# Patient Record
Sex: FEMALE | Race: WHITE | NOT HISPANIC OR LATINO | URBAN - METROPOLITAN AREA
[De-identification: names, ages, dates, MRNs, and addresses within clinical notes are randomized per-mention and may not be internally consistent; named-entity substitution may affect disease eponyms.]

---

## 2017-09-19 ENCOUNTER — INPATIENT (INPATIENT)
Facility: HOSPITAL | Age: 37
LOS: 0 days | Discharge: HOME | DRG: 392 | End: 2017-09-19
Attending: HOSPITALIST | Admitting: HOSPITALIST
Payer: MEDICAID

## 2017-09-19 ENCOUNTER — TRANSCRIPTION ENCOUNTER (OUTPATIENT)
Age: 37
End: 2017-09-19

## 2017-09-19 VITALS
HEART RATE: 75 BPM | OXYGEN SATURATION: 100 % | TEMPERATURE: 98 F | SYSTOLIC BLOOD PRESSURE: 129 MMHG | RESPIRATION RATE: 22 BRPM | DIASTOLIC BLOOD PRESSURE: 72 MMHG

## 2017-09-19 VITALS
TEMPERATURE: 98 F | OXYGEN SATURATION: 98 % | SYSTOLIC BLOOD PRESSURE: 101 MMHG | HEART RATE: 73 BPM | RESPIRATION RATE: 16 BRPM | DIASTOLIC BLOOD PRESSURE: 63 MMHG

## 2017-09-19 DIAGNOSIS — R10.84 GENERALIZED ABDOMINAL PAIN: ICD-10-CM

## 2017-09-19 DIAGNOSIS — K29.00 ACUTE GASTRITIS WITHOUT BLEEDING: ICD-10-CM

## 2017-09-19 DIAGNOSIS — Z29.9 ENCOUNTER FOR PROPHYLACTIC MEASURES, UNSPECIFIED: ICD-10-CM

## 2017-09-19 LAB
ALBUMIN SERPL ELPH-MCNC: 4.7 G/DL — SIGNIFICANT CHANGE UP (ref 3.3–5)
ALP SERPL-CCNC: 67 U/L — SIGNIFICANT CHANGE UP (ref 40–120)
ALT FLD-CCNC: 12 U/L RC — SIGNIFICANT CHANGE UP (ref 10–45)
ANION GAP SERPL CALC-SCNC: 17 MMOL/L — SIGNIFICANT CHANGE UP (ref 5–17)
APPEARANCE UR: CLEAR — SIGNIFICANT CHANGE UP
AST SERPL-CCNC: 16 U/L — SIGNIFICANT CHANGE UP (ref 10–40)
BACTERIA # UR AUTO: ABNORMAL /HPF
BASOPHILS # BLD AUTO: 0.1 K/UL — SIGNIFICANT CHANGE UP (ref 0–0.2)
BASOPHILS NFR BLD AUTO: 0.8 % — SIGNIFICANT CHANGE UP (ref 0–2)
BILIRUB SERPL-MCNC: 0.2 MG/DL — SIGNIFICANT CHANGE UP (ref 0.2–1.2)
BILIRUB UR-MCNC: NEGATIVE — SIGNIFICANT CHANGE UP
BUN SERPL-MCNC: 12 MG/DL — SIGNIFICANT CHANGE UP (ref 7–23)
CALCIUM SERPL-MCNC: 9.8 MG/DL — SIGNIFICANT CHANGE UP (ref 8.4–10.5)
CHLORIDE SERPL-SCNC: 100 MMOL/L — SIGNIFICANT CHANGE UP (ref 96–108)
CO2 SERPL-SCNC: 23 MMOL/L — SIGNIFICANT CHANGE UP (ref 22–31)
COLOR SPEC: COLORLESS — SIGNIFICANT CHANGE UP
COMMENT - URINE: SIGNIFICANT CHANGE UP
CREAT SERPL-MCNC: 0.64 MG/DL — SIGNIFICANT CHANGE UP (ref 0.5–1.3)
DIFF PNL FLD: NEGATIVE — SIGNIFICANT CHANGE UP
EOSINOPHIL # BLD AUTO: 0.1 K/UL — SIGNIFICANT CHANGE UP (ref 0–0.5)
EOSINOPHIL NFR BLD AUTO: 1.6 % — SIGNIFICANT CHANGE UP (ref 0–6)
GAS PNL BLDV: SIGNIFICANT CHANGE UP
GAS PNL BLDV: SIGNIFICANT CHANGE UP
GLUCOSE SERPL-MCNC: 121 MG/DL — HIGH (ref 70–99)
GLUCOSE UR QL: NEGATIVE — SIGNIFICANT CHANGE UP
HCT VFR BLD CALC: 40.1 % — SIGNIFICANT CHANGE UP (ref 34.5–45)
HGB BLD-MCNC: 13.6 G/DL — SIGNIFICANT CHANGE UP (ref 11.5–15.5)
KETONES UR-MCNC: NEGATIVE — SIGNIFICANT CHANGE UP
LEUKOCYTE ESTERASE UR-ACNC: NEGATIVE — SIGNIFICANT CHANGE UP
LIDOCAIN IGE QN: 47 U/L — SIGNIFICANT CHANGE UP (ref 7–60)
LYMPHOCYTES # BLD AUTO: 2.3 K/UL — SIGNIFICANT CHANGE UP (ref 1–3.3)
LYMPHOCYTES # BLD AUTO: 31.4 % — SIGNIFICANT CHANGE UP (ref 13–44)
MCHC RBC-ENTMCNC: 27.9 PG — SIGNIFICANT CHANGE UP (ref 27–34)
MCHC RBC-ENTMCNC: 33.8 GM/DL — SIGNIFICANT CHANGE UP (ref 32–36)
MCV RBC AUTO: 82.7 FL — SIGNIFICANT CHANGE UP (ref 80–100)
MONOCYTES # BLD AUTO: 0.4 K/UL — SIGNIFICANT CHANGE UP (ref 0–0.9)
MONOCYTES NFR BLD AUTO: 5.7 % — SIGNIFICANT CHANGE UP (ref 2–14)
NEUTROPHILS # BLD AUTO: 4.4 K/UL — SIGNIFICANT CHANGE UP (ref 1.8–7.4)
NEUTROPHILS NFR BLD AUTO: 60.5 % — SIGNIFICANT CHANGE UP (ref 43–77)
NITRITE UR-MCNC: NEGATIVE — SIGNIFICANT CHANGE UP
PH UR: 6 — SIGNIFICANT CHANGE UP (ref 5–8)
PLATELET # BLD AUTO: 310 K/UL — SIGNIFICANT CHANGE UP (ref 150–400)
POTASSIUM SERPL-MCNC: 4.1 MMOL/L — SIGNIFICANT CHANGE UP (ref 3.5–5.3)
POTASSIUM SERPL-SCNC: 4.1 MMOL/L — SIGNIFICANT CHANGE UP (ref 3.5–5.3)
PROT SERPL-MCNC: 8.1 G/DL — SIGNIFICANT CHANGE UP (ref 6–8.3)
PROT UR-MCNC: NEGATIVE — SIGNIFICANT CHANGE UP
RBC # BLD: 4.85 M/UL — SIGNIFICANT CHANGE UP (ref 3.8–5.2)
RBC # FLD: 12 % — SIGNIFICANT CHANGE UP (ref 10.3–14.5)
SODIUM SERPL-SCNC: 140 MMOL/L — SIGNIFICANT CHANGE UP (ref 135–145)
SP GR SPEC: 1.01 — LOW (ref 1.01–1.02)
UROBILINOGEN FLD QL: NEGATIVE — SIGNIFICANT CHANGE UP
WBC # BLD: 7.4 K/UL — SIGNIFICANT CHANGE UP (ref 3.8–10.5)
WBC # FLD AUTO: 7.4 K/UL — SIGNIFICANT CHANGE UP (ref 3.8–10.5)
WBC UR QL: SIGNIFICANT CHANGE UP /HPF (ref 0–5)

## 2017-09-19 PROCEDURE — 85014 HEMATOCRIT: CPT

## 2017-09-19 PROCEDURE — 85027 COMPLETE CBC AUTOMATED: CPT

## 2017-09-19 PROCEDURE — 82435 ASSAY OF BLOOD CHLORIDE: CPT

## 2017-09-19 PROCEDURE — 93005 ELECTROCARDIOGRAM TRACING: CPT

## 2017-09-19 PROCEDURE — 99285 EMERGENCY DEPT VISIT HI MDM: CPT | Mod: 25

## 2017-09-19 PROCEDURE — 87086 URINE CULTURE/COLONY COUNT: CPT

## 2017-09-19 PROCEDURE — 93010 ELECTROCARDIOGRAM REPORT: CPT

## 2017-09-19 PROCEDURE — 84295 ASSAY OF SERUM SODIUM: CPT

## 2017-09-19 PROCEDURE — 74177 CT ABD & PELVIS W/CONTRAST: CPT | Mod: 26

## 2017-09-19 PROCEDURE — 80053 COMPREHEN METABOLIC PANEL: CPT

## 2017-09-19 PROCEDURE — 84132 ASSAY OF SERUM POTASSIUM: CPT

## 2017-09-19 PROCEDURE — 96375 TX/PRO/DX INJ NEW DRUG ADDON: CPT

## 2017-09-19 PROCEDURE — 96374 THER/PROPH/DIAG INJ IV PUSH: CPT

## 2017-09-19 PROCEDURE — 83690 ASSAY OF LIPASE: CPT

## 2017-09-19 PROCEDURE — 82330 ASSAY OF CALCIUM: CPT

## 2017-09-19 PROCEDURE — G0378: CPT

## 2017-09-19 PROCEDURE — 74177 CT ABD & PELVIS W/CONTRAST: CPT

## 2017-09-19 PROCEDURE — 82947 ASSAY GLUCOSE BLOOD QUANT: CPT

## 2017-09-19 PROCEDURE — 99235 HOSP IP/OBS SAME DATE MOD 70: CPT

## 2017-09-19 PROCEDURE — 82803 BLOOD GASES ANY COMBINATION: CPT

## 2017-09-19 PROCEDURE — 81001 URINALYSIS AUTO W/SCOPE: CPT

## 2017-09-19 PROCEDURE — 83605 ASSAY OF LACTIC ACID: CPT

## 2017-09-19 RX ORDER — METOCLOPRAMIDE HCL 10 MG
10 TABLET ORAL ONCE
Qty: 0 | Refills: 0 | Status: COMPLETED | OUTPATIENT
Start: 2017-09-19 | End: 2017-09-19

## 2017-09-19 RX ORDER — SUCRALFATE 1 G
1 TABLET ORAL THREE TIMES A DAY
Qty: 0 | Refills: 0 | Status: DISCONTINUED | OUTPATIENT
Start: 2017-09-19 | End: 2017-09-19

## 2017-09-19 RX ORDER — PANTOPRAZOLE SODIUM 20 MG/1
1 TABLET, DELAYED RELEASE ORAL
Qty: 30 | Refills: 0 | OUTPATIENT
Start: 2017-09-19 | End: 2017-10-19

## 2017-09-19 RX ORDER — ONDANSETRON 8 MG/1
4 TABLET, FILM COATED ORAL ONCE
Qty: 0 | Refills: 0 | Status: COMPLETED | OUTPATIENT
Start: 2017-09-19 | End: 2017-09-19

## 2017-09-19 RX ORDER — ACETAMINOPHEN 500 MG
1000 TABLET ORAL ONCE
Qty: 0 | Refills: 0 | Status: COMPLETED | OUTPATIENT
Start: 2017-09-19 | End: 2017-09-19

## 2017-09-19 RX ORDER — SUCRALFATE 1 G
10 TABLET ORAL
Qty: 500 | Refills: 0 | OUTPATIENT
Start: 2017-09-19

## 2017-09-19 RX ORDER — FAMOTIDINE 10 MG/ML
20 INJECTION INTRAVENOUS ONCE
Qty: 0 | Refills: 0 | Status: COMPLETED | OUTPATIENT
Start: 2017-09-19 | End: 2017-09-19

## 2017-09-19 RX ORDER — LIDOCAINE 4 G/100G
15 CREAM TOPICAL ONCE
Qty: 0 | Refills: 0 | Status: COMPLETED | OUTPATIENT
Start: 2017-09-19 | End: 2017-09-19

## 2017-09-19 RX ORDER — SODIUM CHLORIDE 9 MG/ML
1000 INJECTION INTRAMUSCULAR; INTRAVENOUS; SUBCUTANEOUS ONCE
Qty: 0 | Refills: 0 | Status: COMPLETED | OUTPATIENT
Start: 2017-09-19 | End: 2017-09-19

## 2017-09-19 RX ORDER — SUCRALFATE 1 G
10 TABLET ORAL
Qty: 500 | Refills: 0 | OUTPATIENT
Start: 2017-09-19 | End: 2017-10-03

## 2017-09-19 RX ORDER — SUCRALFATE 1 G
1 TABLET ORAL
Qty: 90 | Refills: 0 | OUTPATIENT
Start: 2017-09-19 | End: 2017-10-19

## 2017-09-19 RX ORDER — MORPHINE SULFATE 50 MG/1
4 CAPSULE, EXTENDED RELEASE ORAL ONCE
Qty: 0 | Refills: 0 | Status: DISCONTINUED | OUTPATIENT
Start: 2017-09-19 | End: 2017-09-19

## 2017-09-19 RX ORDER — PANTOPRAZOLE SODIUM 20 MG/1
40 TABLET, DELAYED RELEASE ORAL
Qty: 0 | Refills: 0 | Status: DISCONTINUED | OUTPATIENT
Start: 2017-09-19 | End: 2017-09-19

## 2017-09-19 RX ORDER — ESOMEPRAZOLE MAGNESIUM 40 MG/1
1 CAPSULE, DELAYED RELEASE ORAL
Qty: 30 | Refills: 0 | OUTPATIENT
Start: 2017-09-19 | End: 2017-10-19

## 2017-09-19 RX ORDER — SODIUM CHLORIDE 9 MG/ML
2000 INJECTION INTRAMUSCULAR; INTRAVENOUS; SUBCUTANEOUS ONCE
Qty: 0 | Refills: 0 | Status: COMPLETED | OUTPATIENT
Start: 2017-09-19 | End: 2017-09-19

## 2017-09-19 RX ORDER — SUCRALFATE 1 G
1 TABLET ORAL ONCE
Qty: 0 | Refills: 0 | Status: COMPLETED | OUTPATIENT
Start: 2017-09-19 | End: 2017-09-19

## 2017-09-19 RX ORDER — PANTOPRAZOLE SODIUM 20 MG/1
1 TABLET, DELAYED RELEASE ORAL
Qty: 0 | Refills: 0 | COMMUNITY
Start: 2017-09-19

## 2017-09-19 RX ADMIN — PANTOPRAZOLE SODIUM 40 MILLIGRAM(S): 20 TABLET, DELAYED RELEASE ORAL at 12:23

## 2017-09-19 RX ADMIN — LIDOCAINE 15 MILLILITER(S): 4 CREAM TOPICAL at 04:59

## 2017-09-19 RX ADMIN — MORPHINE SULFATE 4 MILLIGRAM(S): 50 CAPSULE, EXTENDED RELEASE ORAL at 08:06

## 2017-09-19 RX ADMIN — Medication 10 MILLIGRAM(S): at 08:06

## 2017-09-19 RX ADMIN — SODIUM CHLORIDE 4000 MILLILITER(S): 9 INJECTION INTRAMUSCULAR; INTRAVENOUS; SUBCUTANEOUS at 02:11

## 2017-09-19 RX ADMIN — Medication 1000 MILLIGRAM(S): at 04:39

## 2017-09-19 RX ADMIN — Medication 1 GRAM(S): at 04:58

## 2017-09-19 RX ADMIN — SODIUM CHLORIDE 2000 MILLILITER(S): 9 INJECTION INTRAMUSCULAR; INTRAVENOUS; SUBCUTANEOUS at 08:05

## 2017-09-19 RX ADMIN — ONDANSETRON 4 MILLIGRAM(S): 8 TABLET, FILM COATED ORAL at 02:12

## 2017-09-19 RX ADMIN — FAMOTIDINE 20 MILLIGRAM(S): 10 INJECTION INTRAVENOUS at 02:12

## 2017-09-19 RX ADMIN — Medication 400 MILLIGRAM(S): at 02:10

## 2017-09-19 RX ADMIN — Medication 30 MILLILITER(S): at 02:12

## 2017-09-19 NOTE — ED ADULT NURSE REASSESSMENT NOTE - NS ED NURSE REASSESS COMMENT FT1
report received from SAI Almendarez; vss; pt still complaining of 5/10 pain; pt does not want to be discharged; md martin at bedside

## 2017-09-19 NOTE — DISCHARGE NOTE ADULT - MEDICATION SUMMARY - MEDICATIONS TO TAKE
I will START or STAY ON the medications listed below when I get home from the hospital:    sucralfate 1 g/10 mL oral suspension  -- 10 milliliter(s) by mouth 3 times a day  -- Indication: For Gastritis    Protonix 40 mg oral delayed release tablet  -- 1 tab(s) by mouth once a day (before a meal)  -- Indication: For Gastritis

## 2017-09-19 NOTE — CONSULT NOTE ADULT - PROBLEM SELECTOR RECOMMENDATION 9
possibly related to gastritis  ct scan shows no structural issues  no gi objection to dc home with nexium daily, carafate 1g tid and outpatient follow up with her primary GI, if she tolerates lunch

## 2017-09-19 NOTE — DISCHARGE NOTE ADULT - CARE PROVIDER_API CALL
Shantanu Barrera), Gastroenterology  [address] [place] [address]  Phone: [phone]  Fax: [phone]

## 2017-09-19 NOTE — H&P ADULT - CARDIOVASCULAR COMMENTS
Palpitations about 2 month ago, seen by outpatient cardiologist, attributed to stress, no palpitations since.

## 2017-09-19 NOTE — ED PROVIDER NOTE - PROGRESS NOTE DETAILS
Guszack: Pain minimally improved. Will get CT abd/pelvis jami - pt endorsed to me at signout - minimal improvement of pain, ct no etiology of pain , abd soft , nt, no rebound or guarding - pt will be reeval by overnight team post signout (RE) and dispo decision made

## 2017-09-19 NOTE — ED ADULT NURSE NOTE - OBJECTIVE STATEMENT
38 y/o female with no med problems came in c/o abdominal pain radiates to back with nausea since lastnight. Pt denies vomiting or diarrhea, abdomen soft, non distended, tender to touch. Pt denies chest pain or SOB, no fever/chills. Denies any urinary symptoms. Safety maintained & continue monitor.

## 2017-09-19 NOTE — H&P ADULT - HISTORY OF PRESENT ILLNESS
The patient is a 37-year-old woman with PMH of PCOS and gastritis who presents with complaints of epigastric abdominal pain.  The patient reports a history of intermittent "seasonal abdominal pain" and says that she has seen a gastroenterologist yearly since she was in her 20's.  Earlier this year, she has some intermittent, dull, epigastric pain.  Endoscopy done in July showed mild gastritis with evidence of acid reflux.  The pain had resolved by the time of the EGD and the patient was told to take Zantac PRN.    The patient was in her usual state of health until 2 nights ago when she was awoken from sleep with epigastric pain.  The pain was similar to the past pain, but more intense, 9/10 in intensity, constant, radiated to the back.  The patient also reported intermittent feelings of bloating with discomfort radiating from the epigastric area to the throat.  The patient also had some nausea and had worsening pain with eating last night.  The patient took Prilosec yesterday with no relief.  No recent fevers.  No diarrhea, melena, or hematochezia.  No recent weight loss.    In the ED, the patient was given IV Morphine which relieved the pain.

## 2017-09-19 NOTE — H&P ADULT - NSHPLABSRESULTS_GEN_ALL_CORE
LABS:                        13.6   7.4   )-----------( 310      ( 19 Sep 2017 02:10 )             40.1         140  |  100  |  12  ----------------------------<  121<H>  4.1   |  23  |  0.64    Ca    9.8      19 Sep 2017 02:10    TPro  8.1  /  Alb  4.7  /  TBili  0.2  /  DBili  x   /  AST  16  /  ALT  12  /  AlkPhos  67      Lipase - 47      Urinalysis Basic - ( 19 Sep 2017 02:22 )    Color: x / Appearance: Clear / S.008 / pH: x  Gluc: x / Ketone: Negative  / Bili: Negative / Urobili: Negative   Blood: x / Protein: Negative / Nitrite: Negative   Leuk Esterase: Negative / RBC: x / WBC 0-2 /HPF   Sq Epi: x / Non Sq Epi: x / Bacteria: Few /HPF        RADIOLOGY & ADDITIONAL TESTS:    < from: CT Abdomen and Pelvis w/ Oral Cont and w/ IV Cont (17 @ 06:10) >    IMPRESSION:     No bowel obstruction, drainable fluid collection or intraperitoneal free   air. No significant bowel wall thickening or inflammatory change.    < end of copied text > LABS:                        13.6   7.4   )-----------( 310      ( 19 Sep 2017 02:10 )             40.1         140  |  100  |  12  ----------------------------<  121<H>  4.1   |  23  |  0.64    Ca    9.8      19 Sep 2017 02:10    TPro  8.1  /  Alb  4.7  /  TBili  0.2  /  DBili  x   /  AST  16  /  ALT  12  /  AlkPhos  67      Lipase - 47      Urinalysis Basic - ( 19 Sep 2017 02:22 )    Color: x / Appearance: Clear / S.008 / pH: x  Gluc: x / Ketone: Negative  / Bili: Negative / Urobili: Negative   Blood: x / Protein: Negative / Nitrite: Negative   Leuk Esterase: Negative / RBC: x / WBC 0-2 /HPF   Sq Epi: x / Non Sq Epi: x / Bacteria: Few /HPF    EKG - NSR, HR 62.    RADIOLOGY & ADDITIONAL TESTS:    < from: CT Abdomen and Pelvis w/ Oral Cont and w/ IV Cont (17 @ 06:10) >    IMPRESSION:     No bowel obstruction, drainable fluid collection or intraperitoneal free   air. No significant bowel wall thickening or inflammatory change.    < end of copied text >

## 2017-09-19 NOTE — ED PROVIDER NOTE - MEDICAL DECISION MAKING DETAILS
37yof w/ non-specific abdominal pain, nausea, overall benign abdominal exam with no focal tenderness. Will check basic labs, lipase, UA, antiemetics, antacids, and reassess. No indication for imaging at this time.

## 2017-09-19 NOTE — H&P ADULT - FAMILY HISTORY
Mother  Still living? Unknown  Family history of diabetes mellitus type II, Age at diagnosis: Age Unknown     Father  Still living? Yes, Estimated age: Age Unknown  Family history of Crohn's disease, Age at diagnosis: Age Unknown

## 2017-09-19 NOTE — ED PROVIDER NOTE - OBJECTIVE STATEMENT
37yof w/ PCOS p/w diffuse abdominal pain x 2 days. Gradual onset of a diffuse, dull aching abdominal pain, steadily worsening, now 10/10, radiates to the mid back, associated w/ nausea 37yof w/ PCOS p/w diffuse abdominal pain x 2 days. Gradual onset of a diffuse, dull aching abdominal pain, steadily worsening, now 10/10, radiates to the mid back, associated w/ nausea, no vomiting or diarrhea. No urinary symptoms. Has been tolerating PO without worsening of symptoms. No abdominal surgeries. 37yof w/ PCOS p/w diffuse abdominal pain x 2 days. Gradual onset of a diffuse, dull aching abdominal pain, steadily worsening, now 10/10, radiates to the mid back, associated w/ nausea, no vomiting or diarrhea. No urinary symptoms. Has been tolerating PO without worsening of symptoms until this past evening when pain became unbearable. No abdominal surgeries.

## 2017-09-19 NOTE — ED PROVIDER NOTE - ATTENDING CONTRIBUTION TO CARE
Patient with abdominal pain, generalized, persistent, 2 days duration. Patient with nausea and no emesis. No vomiting or diarrhea. No f/c. Patient tolerating some po until today.   mild distress secondary to pain, NCAT, dry MM, Trachea midline, Normal conjunctiva, CTAB, Non-tachycardic, Normal perfusion, Soft, periumbilical pain, ND, No rebound/guarding, No edema, No deformity of extremities, flat affect, Cooperative, With capacity and insight, No rashes, CN grossly intact, Normal coordination, No focal motor or sensory deficits   will get iv, labs, lipase, gi therapeutics, and reassess  patient not improved at this time, still in pain, does not feel comfortable being discharged as pain is still present at this time. Patient with intractable pain, not better with therapeutics, will admit.  Will follow up on labs, analgesia, reassess and disposition to the inpatient team as clinically indicated.

## 2017-09-19 NOTE — DISCHARGE NOTE ADULT - CARE PLAN
Principal Discharge DX:	Acute gastritis without hemorrhage, unspecified gastritis type  Goal:	no further pain  Instructions for follow-up, activity and diet:	Carafate and Protonix as ordered by gastroenterologist  Follow up with gastroenterology   Return to emergency for increased pain, inability to tolerate foods, fever, intractable vomiting, dizziness Principal Discharge DX:	Acute gastritis without hemorrhage, unspecified gastritis type  Goal:	Prevention of further pain  Instructions for follow-up, activity and diet:	It appears that you have gastritis with acid reflux and possible esophageal spasm secondary to the acid reflux.  Please take Carafate and Protonix as prescribed and follow-up with your gastroenterologist in 1-2 weeks.  Return to the emergency department or call your doctor if you develop worsening pain, inability to tolerate foods, or fever.

## 2017-09-19 NOTE — CONSULT NOTE ADULT - SUBJECTIVE AND OBJECTIVE BOX
Chief Complaint:  Patient is a 37y old  Female who presents with a chief complaint of "stomach pain" (19 Sep 2017 10:08)      HPI: 37F with history as listed below who presents with 2 day history of abdominal pain.   she experienced intermittent abdominal pain since july, she saw her outpatient gastroenterologist (dania sevilla) who did an EGD, advised zantac.   her pain is located throughout her abdominal without any localizing signs, relived with pain medication  her labs and CT are unremarkable  her  at bedside would like the patient to be discharged.     Allergies:  No Known Allergies      Medications:  pantoprazole    Tablet 40 milliGRAM(s) Oral before breakfast  sucralfate suspension 1 Gram(s) Oral three times a day      PMHX/PSHX:  PCOS (polycystic ovarian syndrome)  Gastritis  Vitamin D deficiency  S/P tonsillectomy      Family history:  Family history of Crohn's disease (Father)  Family history of diabetes mellitus type II (Mother)      Social History: no toxic habits    ROS:     General:  No wt loss, fevers, chills, night sweats, fatigue,   Eyes:  Good vision, no reported pain  ENT:  No sore throat, pain, runny nose, dysphagia  CV:  No pain, palpitations, hypo/hypertension  Resp:  No dyspnea, cough, tachypnea, wheezing  GI:  + pain, No nausea, No vomiting, No diarrhea, No constipation, No weight loss, No fever, No pruritis, No rectal bleeding, No tarry stools, No dysphagia,  :  No pain, bleeding, incontinence, nocturia  Muscle:  No pain, weakness  Neuro:  No weakness, tingling, memory problems  Psych:  No fatigue, insomnia, mood problems, depression  Endocrine:  No polyuria, polydipsia, cold/heat intolerance  Heme:  No petechiae, ecchymosis, easy bruisability  Skin:  No rash, tattoos, scars, edema      PHYSICAL EXAM:   Vital Signs:  Vital Signs Last 24 Hrs  T(C): 36.6 (19 Sep 2017 11:30), Max: 37.1 (19 Sep 2017 03:22)  T(F): 97.8 (19 Sep 2017 11:30), Max: 98.7 (19 Sep 2017 03:22)  HR: 73 (19 Sep 2017 11:30) (65 - 82)  BP: 101/63 (19 Sep 2017 11:30) (101/63 - 132/86)  BP(mean): --  RR: 16 (19 Sep 2017 11:30) (16 - 22)  SpO2: 98% (19 Sep 2017 11:30) (98% - 100%)  Daily     Daily     GENERAL:  Appears stated age, well-groomed, well-nourished, no distress  HEENT:  NC/AT,  conjunctivae clear and pink, no thyromegaly, nodules, adenopathy, no JVD, sclera -anicteric  CHEST:  Full & symmetric excursion, no increased effort, breath sounds clear  HEART:  Regular rhythm, S1, S2, no murmur/rub/S3/S4, no abdominal bruit, no edema  ABDOMEN:  Soft, non-tender, non-distended, normoactive bowel sounds,  no masses ,no hepato-splenomegaly, no signs of chronic liver disease  EXTEREMITIES:  no cyanosis,clubbing or edema  SKIN:  No rash/erythema/ecchymoses/petechiae/wounds/abscess/warm/dry  NEURO:  Alert, oriented, no asterixis, no tremor, no encephalopathy    LABS:                        13.6   7.4   )-----------( 310      ( 19 Sep 2017 02:10 )             40.1     -    140  |  100  |  12  ----------------------------<  121<H>  4.1   |  23  |  0.64    Ca    9.8      19 Sep 2017 02:10    TPro  8.1  /  Alb  4.7  /  TBili  0.2  /  DBili  x   /  AST  16  /  ALT  12  /  AlkPhos  67      LIVER FUNCTIONS - ( 19 Sep 2017 02:10 )  Alb: 4.7 g/dL / Pro: 8.1 g/dL / ALK PHOS: 67 U/L / ALT: 12 U/L RC / AST: 16 U/L / GGT: x             Urinalysis Basic - ( 19 Sep 2017 02:22 )    Color: x / Appearance: Clear / S.008 / pH: x  Gluc: x / Ketone: Negative  / Bili: Negative / Urobili: Negative   Blood: x / Protein: Negative / Nitrite: Negative   Leuk Esterase: Negative / RBC: x / WBC 0-2 /HPF   Sq Epi: x / Non Sq Epi: x / Bacteria: Few /HPF      Amylase Serum--      Lipase serum47       Ammonia--      Imaging:

## 2017-09-19 NOTE — H&P ADULT - PROBLEM SELECTOR PLAN 1
Epigastric abdominal pain.  CT unremarkable, Lipase wnl.  Suspect acute on chronic gastritis.  Will start PPI BID.  GI consult called (Dr. Ibanez). Epigastric abdominal pain.  CT unremarkable, Lipase wnl.  Case d/w Dr. Ibanez (GI).  Suspect acute on chronic gastritis with GERD and possible esophageal spasm secondary to the GERD.  Will start PPI and Carafate.  If tolerates lunch, could be discharged with outpatient GI follow-up.

## 2017-09-19 NOTE — ED ADULT NURSE REASSESSMENT NOTE - NS ED NURSE REASSESS COMMENT FT1
Pt sitting on assigned stretcher, complaining of "something feels like choking me"  Samuel DURÁN was notified, no swelling to throat, lungs are clear, O2 Sat 100% in room air. Continue monitor.

## 2017-09-19 NOTE — DISCHARGE NOTE ADULT - PATIENT PORTAL LINK FT
“You can access the FollowHealth Patient Portal, offered by [facility], by registering with the following website: [email or web address]”

## 2017-09-19 NOTE — DISCHARGE NOTE ADULT - PLAN OF CARE
no further pain Carafate and Protonix as ordered by gastroenterologist  Follow up with gastroenterology   Return to emergency for increased pain, inability to tolerate foods, fever, intractable vomiting, dizziness Prevention of further pain It appears that you have gastritis with acid reflux and possible esophageal spasm secondary to the acid reflux.  Please take Carafate and Protonix as prescribed and follow-up with your gastroenterologist in 1-2 weeks.  Return to the emergency department or call your doctor if you develop worsening pain, inability to tolerate foods, or fever.

## 2017-09-19 NOTE — DISCHARGE NOTE ADULT - HOSPITAL COURSE
The patient is a 37-year-old woman with PMH of PCOS and gastritis who presented with complaints of epigastric abdominal pain that had been present for 2 days.  The pain was a dull pain, 9/10 in intensity, constant, radiated to the back.  The patient also reported intermittent feelings of bloating with discomfort radiating from the epigastric area to the throat.    In the ED, Lipase was wnl and CT abdomen was negative.    The patient was admitted to the medicine service and seen by GI on consult.  The patient was felt to have gastritis with acid reflux and possible esophageal spasm secondary to the acid reflux.  The patient was started on a PPI and Carafate.  GI recommended that if the patient tolerated lunch that she be discharged with outpatient GI follow-up.  The patient only had soup for lunch (as that was all she was in the mood for), but tolerated it well and was eager to go home.  After discussion with GI, the patient was discharged with plans for outpatient GI follow-up.

## 2017-09-19 NOTE — H&P ADULT - ASSESSMENT
The patient is a 37-year-old woman with PMH of PCOS and gastritis who presents with complaints of epigastric abdominal pain.

## 2017-09-20 LAB
CULTURE RESULTS: NO GROWTH — SIGNIFICANT CHANGE UP
SPECIMEN SOURCE: SIGNIFICANT CHANGE UP

## 2021-06-09 PROBLEM — K29.70 GASTRITIS, UNSPECIFIED, WITHOUT BLEEDING: Chronic | Status: ACTIVE | Noted: 2017-09-19

## 2021-06-09 PROBLEM — E28.2 POLYCYSTIC OVARIAN SYNDROME: Chronic | Status: ACTIVE | Noted: 2017-09-19

## 2021-06-22 ENCOUNTER — APPOINTMENT (OUTPATIENT)
Dept: NEUROLOGY | Facility: CLINIC | Age: 41
End: 2021-06-22
Payer: MEDICAID

## 2021-06-22 VITALS
WEIGHT: 139 LBS | HEART RATE: 83 BPM | HEIGHT: 64 IN | DIASTOLIC BLOOD PRESSURE: 74 MMHG | BODY MASS INDEX: 23.73 KG/M2 | SYSTOLIC BLOOD PRESSURE: 113 MMHG

## 2021-06-22 DIAGNOSIS — I49.8 OTHER SPECIFIED CARDIAC ARRHYTHMIAS: ICD-10-CM

## 2021-06-22 DIAGNOSIS — R11.0 NAUSEA: ICD-10-CM

## 2021-06-22 DIAGNOSIS — I95.1 ORTHOSTATIC HYPOTENSION: ICD-10-CM

## 2021-06-22 PROCEDURE — 99204 OFFICE O/P NEW MOD 45 MIN: CPT

## 2021-06-22 RX ORDER — ONDANSETRON 4 MG/1
4 TABLET ORAL
Qty: 20 | Refills: 0 | Status: ACTIVE | COMMUNITY
Start: 2021-06-22 | End: 1900-01-01

## 2021-06-22 NOTE — REASON FOR VISIT
Anesthesia Evaluation     Patient summary reviewed and Nursing notes reviewed    No history of anesthetic complications   Airway   Mallampati: II  TM distance: >3 FB  Neck ROM: full  no difficulty expected  Dental - normal exam     Pulmonary - negative pulmonary ROS and normal exam    breath sounds clear to auscultation  Cardiovascular - normal exam  (+) valvular problems/murmurs (murmur, asymptomatic),     ECG reviewed  Rhythm: regular  Rate: normal    Neuro/Psych- negative ROS  GI/Hepatic/Renal/Endo    (+) obesity,  GERD (mild) well controlled,     Musculoskeletal (-) negative ROS    Abdominal    Substance History - negative use     OB/GYN negative ob/gyn ROS         Other - negative ROS                          Anesthesia Plan    ASA 2     general   (Bilateral TAP blocks for post-operative analgesia per request of Dr. Dick)  intravenous induction   Anesthetic plan and risks discussed with patient.    Plan discussed with CRNA.       [Initial Evaluation] : an initial evaluation

## 2021-07-01 NOTE — HISTORY OF PRESENT ILLNESS
[FreeTextEntry1] : 42 yo woman reporting that about 15 years ago she had an episode of waking up in the morning feeling very dizzy and lightheaded, and could not walk for 3 days.  One year later, the same thing happened, and she was hospitalized at [facility], work up was negative.  Since then, she has had about one episode per year.  \par \par Event description: She feels severe lightheadedness and near syncope, associated with nausea.  No true vertigo.  No associated headache.  She may have associated photophobia.   Duration is about 3-5 days.  Not related to menses.  No disorientation.  \par \par Frequency: about once a year, but this year occurred 3 times\par \par She does have a history of migraine headaches, occurring about once a month, around the time of her menses.  Headaches are unilateral, throbbing with photophobia. \par \par PMHx: ulcerative colitis, not on medications, followed by GI doctor, Dr Bean\par \par MRI brain done 2-3 years ago was normal.

## 2021-07-01 NOTE — ASSESSMENT
[FreeTextEntry1] : 40 yo woman with recurrent episodes of lightheadedness, nausea, photophobia.  Possible acephalgic migraine syndrome, or migraine variant.  DiffDx includes POTS\par \par Plan:\par 1. Tilt table test\par 2. Consider migraine prophylaxis\par 3. Patient agrees with plan.\par 4. Follow up after testing completed.\par \par Vinay Sullivan MD\par [facility]\par \par Greater than 50% of the encounter was spent on counseling and coordination of care discussing differential diagnosis, diagnostic testing, and treatment options. We have talked about appropriate follow up, and I have spent 45 minutes of face to face time with the patient.\par

## 2021-07-01 NOTE — PHYSICAL EXAM
[FreeTextEntry1] : Awake, alert, oriented x 3.  Language fluent.  Comprehension intact.  Naming intact.  Repetition intact.  Affect normal.  Cranial nerves grossly intact.  Motor exam: normal bulk, normal tone, 5/5 in all four extremities.  No tremors or fasciculations.  Sensory exam: intact to LT/PP/MICAH/vibration.  DTRs: 2+ throughout, flexor plantar response bilaterally, no clonus.  Coordination: no dysmetria.  Gait: normal toe/heel/tandem gait.  Romberg - negative\par

## 2021-07-28 RX ORDER — PROPRANOLOL HYDROCHLORIDE 60 MG/1
60 CAPSULE, EXTENDED RELEASE ORAL
Qty: 30 | Refills: 2 | Status: ACTIVE | COMMUNITY
Start: 2021-07-28 | End: 1900-01-01

## 2022-03-29 ENCOUNTER — APPOINTMENT (OUTPATIENT)
Dept: DERMATOLOGY | Facility: CLINIC | Age: 42
End: 2022-03-29
Payer: MEDICAID

## 2022-03-29 DIAGNOSIS — L70.0 ACNE VULGARIS: ICD-10-CM

## 2022-03-29 DIAGNOSIS — L30.9 DERMATITIS, UNSPECIFIED: ICD-10-CM

## 2022-03-29 PROCEDURE — 99204 OFFICE O/P NEW MOD 45 MIN: CPT

## 2022-03-29 RX ORDER — MOMETASONE FUROATE 1 MG/G
0.1 OINTMENT TOPICAL
Qty: 1 | Refills: 0 | Status: ACTIVE | COMMUNITY
Start: 2022-03-29 | End: 1900-01-01

## 2022-03-29 RX ORDER — CLINDAMYCIN AND BENZOYL PEROXIDE 50; 10 MG/G; MG/G
1-5 GEL TOPICAL
Qty: 1 | Refills: 2 | Status: ACTIVE | COMMUNITY
Start: 2022-03-29 | End: 1900-01-01

## 2022-04-13 RX ORDER — CLINDAMYCIN PHOSPHATE 1 G/10ML
1 GEL TOPICAL DAILY
Qty: 1 | Refills: 3 | Status: ACTIVE | COMMUNITY
Start: 2022-04-13 | End: 1900-01-01

## 2022-04-27 ENCOUNTER — APPOINTMENT (OUTPATIENT)
Dept: DERMATOLOGY | Facility: CLINIC | Age: 42
End: 2022-04-27

## 2023-07-24 ENCOUNTER — EMERGENCY (EMERGENCY)
Facility: HOSPITAL | Age: 43
LOS: 1 days | Discharge: HOME | End: 2023-07-24
Attending: EMERGENCY MEDICINE
Payer: MEDICAID

## 2023-07-24 VITALS
DIASTOLIC BLOOD PRESSURE: 81 MMHG | SYSTOLIC BLOOD PRESSURE: 117 MMHG | OXYGEN SATURATION: 100 % | TEMPERATURE: 98 F | RESPIRATION RATE: 17 BRPM | HEART RATE: 69 BPM

## 2023-07-24 VITALS
HEIGHT: 64 IN | DIASTOLIC BLOOD PRESSURE: 76 MMHG | OXYGEN SATURATION: 97 % | SYSTOLIC BLOOD PRESSURE: 117 MMHG | WEIGHT: 138.01 LBS | HEART RATE: 81 BPM | TEMPERATURE: 98 F | RESPIRATION RATE: 18 BRPM

## 2023-07-24 LAB
ALBUMIN SERPL ELPH-MCNC: 4.7 G/DL — SIGNIFICANT CHANGE UP (ref 3.3–5)
ALP SERPL-CCNC: 68 U/L — SIGNIFICANT CHANGE UP (ref 40–120)
ALT FLD-CCNC: 14 U/L — SIGNIFICANT CHANGE UP (ref 10–45)
ANION GAP SERPL CALC-SCNC: 11 MMOL/L — SIGNIFICANT CHANGE UP (ref 5–17)
APPEARANCE UR: CLEAR — SIGNIFICANT CHANGE UP
AST SERPL-CCNC: 20 U/L — SIGNIFICANT CHANGE UP (ref 10–40)
BACTERIA # UR AUTO: NEGATIVE — SIGNIFICANT CHANGE UP
BASOPHILS # BLD AUTO: 0.05 K/UL — SIGNIFICANT CHANGE UP (ref 0–0.2)
BASOPHILS NFR BLD AUTO: 0.8 % — SIGNIFICANT CHANGE UP (ref 0–2)
BILIRUB SERPL-MCNC: 0.2 MG/DL — SIGNIFICANT CHANGE UP (ref 0.2–1.2)
BILIRUB UR-MCNC: NEGATIVE — SIGNIFICANT CHANGE UP
BLD GP AB SCN SERPL QL: NEGATIVE — SIGNIFICANT CHANGE UP
BUN SERPL-MCNC: 11 MG/DL — SIGNIFICANT CHANGE UP (ref 7–23)
CALCIUM SERPL-MCNC: 9.3 MG/DL — SIGNIFICANT CHANGE UP (ref 8.4–10.5)
CHLORIDE SERPL-SCNC: 103 MMOL/L — SIGNIFICANT CHANGE UP (ref 96–108)
CO2 SERPL-SCNC: 25 MMOL/L — SIGNIFICANT CHANGE UP (ref 22–31)
COLOR SPEC: COLORLESS — SIGNIFICANT CHANGE UP
CREAT SERPL-MCNC: 0.5 MG/DL — SIGNIFICANT CHANGE UP (ref 0.5–1.3)
DIFF PNL FLD: ABNORMAL
EGFR: 120 ML/MIN/1.73M2 — SIGNIFICANT CHANGE UP
EOSINOPHIL # BLD AUTO: 0.21 K/UL — SIGNIFICANT CHANGE UP (ref 0–0.5)
EOSINOPHIL NFR BLD AUTO: 3.2 % — SIGNIFICANT CHANGE UP (ref 0–6)
EPI CELLS # UR: 1 /HPF — SIGNIFICANT CHANGE UP
GLUCOSE SERPL-MCNC: 110 MG/DL — HIGH (ref 70–99)
GLUCOSE UR QL: NEGATIVE — SIGNIFICANT CHANGE UP
HCG SERPL-ACNC: <2 MIU/ML — SIGNIFICANT CHANGE UP
HCT VFR BLD CALC: 37.7 % — SIGNIFICANT CHANGE UP (ref 34.5–45)
HGB BLD-MCNC: 12.5 G/DL — SIGNIFICANT CHANGE UP (ref 11.5–15.5)
IMM GRANULOCYTES NFR BLD AUTO: 0.5 % — SIGNIFICANT CHANGE UP (ref 0–0.9)
KETONES UR-MCNC: NEGATIVE — SIGNIFICANT CHANGE UP
LEUKOCYTE ESTERASE UR-ACNC: ABNORMAL
LYMPHOCYTES # BLD AUTO: 1.83 K/UL — SIGNIFICANT CHANGE UP (ref 1–3.3)
LYMPHOCYTES # BLD AUTO: 28.2 % — SIGNIFICANT CHANGE UP (ref 13–44)
MCHC RBC-ENTMCNC: 27.8 PG — SIGNIFICANT CHANGE UP (ref 27–34)
MCHC RBC-ENTMCNC: 33.2 GM/DL — SIGNIFICANT CHANGE UP (ref 32–36)
MCV RBC AUTO: 83.8 FL — SIGNIFICANT CHANGE UP (ref 80–100)
MONOCYTES # BLD AUTO: 0.39 K/UL — SIGNIFICANT CHANGE UP (ref 0–0.9)
MONOCYTES NFR BLD AUTO: 6 % — SIGNIFICANT CHANGE UP (ref 2–14)
NEUTROPHILS # BLD AUTO: 3.98 K/UL — SIGNIFICANT CHANGE UP (ref 1.8–7.4)
NEUTROPHILS NFR BLD AUTO: 61.3 % — SIGNIFICANT CHANGE UP (ref 43–77)
NITRITE UR-MCNC: NEGATIVE — SIGNIFICANT CHANGE UP
NRBC # BLD: 0 /100 WBCS — SIGNIFICANT CHANGE UP (ref 0–0)
PH UR: 6 — SIGNIFICANT CHANGE UP (ref 5–8)
PLATELET # BLD AUTO: 288 K/UL — SIGNIFICANT CHANGE UP (ref 150–400)
POTASSIUM SERPL-MCNC: 4.4 MMOL/L — SIGNIFICANT CHANGE UP (ref 3.5–5.3)
POTASSIUM SERPL-SCNC: 4.4 MMOL/L — SIGNIFICANT CHANGE UP (ref 3.5–5.3)
PROT SERPL-MCNC: 7.5 G/DL — SIGNIFICANT CHANGE UP (ref 6–8.3)
PROT UR-MCNC: NEGATIVE — SIGNIFICANT CHANGE UP
RBC # BLD: 4.5 M/UL — SIGNIFICANT CHANGE UP (ref 3.8–5.2)
RBC # FLD: 12.5 % — SIGNIFICANT CHANGE UP (ref 10.3–14.5)
RBC CASTS # UR COMP ASSIST: 3 /HPF — SIGNIFICANT CHANGE UP (ref 0–4)
RH IG SCN BLD-IMP: POSITIVE — SIGNIFICANT CHANGE UP
SODIUM SERPL-SCNC: 139 MMOL/L — SIGNIFICANT CHANGE UP (ref 135–145)
SP GR SPEC: 1.01 — SIGNIFICANT CHANGE UP (ref 1.01–1.02)
UROBILINOGEN FLD QL: NEGATIVE — SIGNIFICANT CHANGE UP
WBC # BLD: 6.49 K/UL — SIGNIFICANT CHANGE UP (ref 3.8–10.5)
WBC # FLD AUTO: 6.49 K/UL — SIGNIFICANT CHANGE UP (ref 3.8–10.5)
WBC UR QL: 8 /HPF — HIGH (ref 0–5)

## 2023-07-24 PROCEDURE — 80053 COMPREHEN METABOLIC PANEL: CPT

## 2023-07-24 PROCEDURE — 96375 TX/PRO/DX INJ NEW DRUG ADDON: CPT

## 2023-07-24 PROCEDURE — 99284 EMERGENCY DEPT VISIT MOD MDM: CPT | Mod: 25

## 2023-07-24 PROCEDURE — 82435 ASSAY OF BLOOD CHLORIDE: CPT

## 2023-07-24 PROCEDURE — 86850 RBC ANTIBODY SCREEN: CPT

## 2023-07-24 PROCEDURE — 86900 BLOOD TYPING SEROLOGIC ABO: CPT

## 2023-07-24 PROCEDURE — 86901 BLOOD TYPING SEROLOGIC RH(D): CPT

## 2023-07-24 PROCEDURE — 85025 COMPLETE CBC W/AUTO DIFF WBC: CPT

## 2023-07-24 PROCEDURE — 84295 ASSAY OF SERUM SODIUM: CPT

## 2023-07-24 PROCEDURE — 85014 HEMATOCRIT: CPT

## 2023-07-24 PROCEDURE — 81001 URINALYSIS AUTO W/SCOPE: CPT

## 2023-07-24 PROCEDURE — 82330 ASSAY OF CALCIUM: CPT

## 2023-07-24 PROCEDURE — 99284 EMERGENCY DEPT VISIT MOD MDM: CPT

## 2023-07-24 PROCEDURE — 82803 BLOOD GASES ANY COMBINATION: CPT

## 2023-07-24 PROCEDURE — 82947 ASSAY GLUCOSE BLOOD QUANT: CPT

## 2023-07-24 PROCEDURE — 83605 ASSAY OF LACTIC ACID: CPT

## 2023-07-24 PROCEDURE — 96374 THER/PROPH/DIAG INJ IV PUSH: CPT

## 2023-07-24 PROCEDURE — 84702 CHORIONIC GONADOTROPIN TEST: CPT

## 2023-07-24 PROCEDURE — 84132 ASSAY OF SERUM POTASSIUM: CPT

## 2023-07-24 PROCEDURE — 85018 HEMOGLOBIN: CPT

## 2023-07-24 RX ORDER — ACETAMINOPHEN 500 MG
1000 TABLET ORAL ONCE
Refills: 0 | Status: COMPLETED | OUTPATIENT
Start: 2023-07-24 | End: 2023-07-24

## 2023-07-24 RX ORDER — KETOROLAC TROMETHAMINE 30 MG/ML
15 SYRINGE (ML) INJECTION ONCE
Refills: 0 | Status: DISCONTINUED | OUTPATIENT
Start: 2023-07-24 | End: 2023-07-24

## 2023-07-24 RX ORDER — CYCLOBENZAPRINE HYDROCHLORIDE 10 MG/1
1 TABLET, FILM COATED ORAL
Qty: 6 | Refills: 0
Start: 2023-07-24 | End: 2023-07-26

## 2023-07-24 RX ORDER — DIAZEPAM 5 MG
5 TABLET ORAL ONCE
Refills: 0 | Status: DISCONTINUED | OUTPATIENT
Start: 2023-07-24 | End: 2023-07-24

## 2023-07-24 RX ORDER — SODIUM CHLORIDE 9 MG/ML
1000 INJECTION INTRAMUSCULAR; INTRAVENOUS; SUBCUTANEOUS ONCE
Refills: 0 | Status: COMPLETED | OUTPATIENT
Start: 2023-07-24 | End: 2023-07-24

## 2023-07-24 RX ORDER — LIDOCAINE 4 G/100G
1 CREAM TOPICAL ONCE
Refills: 0 | Status: COMPLETED | OUTPATIENT
Start: 2023-07-24 | End: 2023-07-24

## 2023-07-24 RX ADMIN — LIDOCAINE 1 PATCH: 4 CREAM TOPICAL at 16:08

## 2023-07-24 RX ADMIN — Medication 15 MILLIGRAM(S): at 16:08

## 2023-07-24 RX ADMIN — SODIUM CHLORIDE 1000 MILLILITER(S): 9 INJECTION INTRAMUSCULAR; INTRAVENOUS; SUBCUTANEOUS at 16:08

## 2023-07-24 RX ADMIN — Medication 400 MILLIGRAM(S): at 16:07

## 2023-07-24 RX ADMIN — Medication 5 MILLIGRAM(S): at 16:08

## 2023-07-24 NOTE — ED PROVIDER NOTE - CLINICAL SUMMARY MEDICAL DECISION MAKING FREE TEXT BOX
Laura Osorio DO PGY-3  43-year-old female with history of ulcerative colitis, Presents to the ED with 3 days of right low back pain that is atraumatic, sharp, worse with movement and position.  No spinal tenderness, no urinary incontinence, no paresthesias.  Likely musculoskeletal sprain versus spasm.  Will additionally evaluate for lightheadedness with labs, EKG. Laura Osorio DO PGY-3  43-year-old female with history of ulcerative colitis, Presents to the ED with 3 days of right low back pain that is atraumatic, sharp, worse with movement and position.  No spinal tenderness, no urinary incontinence, no paresthesias. No focal neuro deficits. Likely musculoskeletal sprain versus spasm. Will Additionally evaluate for hematologic or electrolyte abnormalities given lightheadedness, will obtain EKG to evaluate for arrhythmia. Plan for labs, EKG, pain meds, and reassess for dispo.

## 2023-07-24 NOTE — ED ADULT NURSE NOTE - OBJECTIVE STATEMENT
patient is a 44 y/o F with hx of UC who presents to the ED c/o atraumatic back pain since friday. patient states that she has been setting up her home for her sons bar mitzvah and noticed that she was having right lower back pain on friday which has progressed over the weekend and now is 10/10 severe pain that worsens with movement. pain radiates down the right leg and also radiates around to the right lower abdomen. patient states that she stopped taking her UC meds 1 week ago. patient is a&ox4, PERRL. strong peripheral pulses, strong extremities x4. able to bear weight. respirations even and unlabored. abd soft, nondistended. skin intact. IV placed. MD Damian at bedside to assess

## 2023-07-24 NOTE — ED ADULT NURSE NOTE - CAS TRG GEN SKIN COLOR
Called pt and scheduled her to see STEFANO Hernandez next Monday Nov 29, 2021 at 12:00PM. Pt will follow as scheduled.    Chary Aguilar, VIRGEN    
[facility] Call Center    Phone Message    May a detailed message be left on voicemail: yes     Reason for Call: Appointment Intake    Referring Provider Name: Pop Zapien  Diagnosis and/or Symptoms: Rectal Bleeding    Action Taken: Message routed to:  [facility] Colon[facility]Rectal    Travel Screening: Not Applicable                                                                        
Normal for race

## 2023-07-24 NOTE — ED PROVIDER NOTE - ATTENDING CONTRIBUTION TO CARE
RGUJRAL 42yo female hx UC on mesalamine presents with R buttock pain radiating down her leg. Denies any abdominal pain, n/v/d. Pt has some bleeding from her rectum at times that is baseline since she hasn't taken her mesalamine at times. No f/c. No dysuria or hematuria. No numbness, tingling or weakness. Pain is worse w movement.   On exam, Patient is awake,alert,oriented x 3. Patient is well appearing and in no acute distress. Patient's chest is clear to ausculation, +s1s2. Abdomen is soft nd/nt +BS. Extremity with no swelling or calf tenderness. No posterior midline T/L ttp. No cvat. 5/5 strength b/l LE, nml sensation. Pain noted on exam with movement. Discussed with patient will obtain labs, UA. Treat for sciatica and pain control and monitor.

## 2023-07-24 NOTE — ED PROVIDER NOTE - NSFOLLOWUPINSTRUCTIONS_ED_ALL_ED_FT
You were seen in the ER today for Back pain.    Please follow up with the [facility]. Call [phone] for further evaluation and management.     Please follow up with your primary care doctor within 1 - 3 days. Call and let them know you were seen in the ER today.   Bring the results of your blood work and imaging with you to your appointment, if applicable.    For pain, please take acetaminophen 650 mg every 6 hours for pain. Additionally, you can also take ibuprofen 400 mg every 6 hours for pain.    Return to the ER for any worsening symptoms or concerns, including chest pain, shortness of breath, lightheadedness, numbness, weakness, or any other concerns. You were seen in the ER today for Back pain.    Please follow up with the [facility]. Call [phone] for further evaluation and management.     Please follow up with your primary care doctor within 1 - 3 days. Call and let them know you were seen in the ER today.   Bring the results of your blood work and imaging with you to your appointment, if applicable.    For pain, please take acetaminophen 650 mg every 6 hours for pain. Additionally, you can also take ibuprofen 400 mg every 6 hours for pain.  We sent a muscle relaxer, cyclobenzaprine to the pharmacy. You can take one tablet at nighttime. Do not drive while on this medication as it may make you drowsy.    Return to the ER for any worsening symptoms or concerns, including chest pain, shortness of breath, lightheadedness, numbness, weakness, or any other concerns.

## 2023-07-24 NOTE — ED PROVIDER NOTE - PATIENT PORTAL LINK FT
You can access the FollowMyHealth Patient Portal offered by [facility] by registering at the following website: [email or web address]. By joining [email or web address]’s FollowMyHealth portal, you will also be able to view your health information using other applications (apps) compatible with our system.

## 2023-07-24 NOTE — ED PROVIDER NOTE - OBJECTIVE STATEMENT
3-year-old female with history of ulcerative colitis on mesalamine presents to the ED with 3 days of sharp constant right atraumatic low back pain worse with movement and position, patient reports that she woke up with the pain the day after moving heavy boxes. Patient has been trying Tylenol and ibuprofen for symptoms, last dose was at 10 AM with 500 mg of Tylenol and 400 mg of ibuprofen.  Patient also reports having some lightheadedness the past 3 days associated with the back pain, and some nausea.  Patient has been eating and drinking 43-year-old female with history of ulcerative colitis on mesalamine presents to the ED with 3 days of sharp constant right atraumatic low back pain worse with movement and position, patient reports that she woke up with the pain the day after moving heavy boxes. Patient has been trying Tylenol and ibuprofen for symptoms, last dose was at 10 AM with 500 mg of Tylenol and 400 mg of ibuprofen.  Patient also reports having some lightheadedness the past 3 days associated with the back pain, and some nausea. Also has been having some rectal bleeding notices blood on the toilet paper when she wipes.  Patient reports she is seeing her GI doctor for her rectal bleeding, is controlling UC with mesalamine and diet. Patient has been eating and drinking well. Denies fevers, chills, chest pain, shortness of breath, nausea, vomiting, diarrhea, abdominal pain, dysuria, hematuria.  No recent injections or procedures in the back.

## 2023-07-24 NOTE — ED PROVIDER NOTE - NSICDXFAMILYHX_GEN_ALL_CORE_FT
FAMILY HISTORY:  Father  Still living? Yes, Estimated age: Age Unknown  Family history of Crohn's disease, Age at diagnosis: Age Unknown    Mother  Still living? Unknown  Family history of diabetes mellitus type II, Age at diagnosis: Age Unknown

## 2023-07-24 NOTE — ED PROVIDER NOTE - PROGRESS NOTE DETAILS
Laura Osorio,  PGY-3  Patient reevaluated, states that she feels better, appears more comfortable in the ER.  Ambulatory with normal gait without difficulty.    UA with leuk esterase, negative nitrites/bacteria. Will await cultures to treat as patient is not symptomatic.    Discussed the plan for discharge home with the patient. Advised return precautions for any alarming or worsening symptoms, or any other concerns. Recommended that the patient call their primary care doctor today, inform them of their visit to the ER, and to obtain repeat evaluation from their PCP in the next 1-3 days. Patient expresses understanding and agrees with the plan for follow up. At the time of discharge the patient remained stable, in no acute distress.    Will provide spine center referral for follow up if symptoms persist.

## 2023-07-24 NOTE — ED PROVIDER NOTE - NSICDXPASTMEDICALHX_GEN_ALL_CORE_FT
PAST MEDICAL HISTORY:  Gastritis     PCOS (polycystic ovarian syndrome)     Vitamin D deficiency

## 2023-07-24 NOTE — ED PROVIDER NOTE - PHYSICAL EXAMINATION
PHYSICAL EXAM:  CONSTITUTIONAL: Well appearing, awake, alert, oriented to person, place, time/situation and in no apparent distress.  HEAD: Atraumatic, no step offs.  NECK: Supple, no meningismus.   EYES: Clear bilaterally, pupils equal, round and reactive to light.  ENMT: Airway patent, Nasal mucosa clear. Mouth with normal mucosa. Uvula is midline.   CARDIAC: Normal rate, regular rhythm. +S1/S2. No murmurs, rubs or gallops.  RESPIRATORY: Breathing unlabored. Breath sounds clear and equal bilaterally.  ABDOMEN:  Soft, nontender, nondistended. No rebound tenderness or guarding.  FLANK: No CVA tenderness B/L.  NEUROLOGICAL: Alert and oriented, no focal deficits, no motor or sensory deficits. CN2-12 intact. Sensation intact x4 extremities. Strength equal of upper and lower extremities B/L.   MSK: No clubbing, cyanosis, or edema. Full range of motion of all extremities. No tenderness to palpation. No midline or paraspinal tenderness. No spinal step-offs.  SKIN: Skin warm and dry. No evidence of rashes or lesions.

## 2025-04-29 NOTE — ED ADULT NURSE NOTE - NSFALLRISKINTERV_ED_ALL_ED
Caller: [email or web address]    Relationship:     Best call back number: [phone]     What is the best time to reach you: ANY    Who are you requesting to speak with (clinical staff, provider,  specific staff member):         What was the call regarding: [facility] TO CONTINUE OT 1 TIME A WEEK FOR 8 MORE WEEKS.    PLEASE CALL WITH VERBAL       Assistance OOB with selected safe patient handling equipment if applicable/Assistance with ambulation/Communicate fall risk and risk factors to all staff, patient, and family/Monitor gait and stability/Provide patient with walking aids/Provide visual cue: yellow wristband, yellow gown, etc/Reinforce activity limits and safety measures with patient and family/Call bell, personal items and telephone in reach/Instruct patient to call for assistance before getting out of bed/chair/stretcher/Non-slip footwear applied when patient is off stretcher/[place] to call system/Physically safe environment - no spills, clutter or unnecessary equipment/Purposeful Proactive Rounding/Room/bathroom lighting operational, light cord in reach

## 2025-08-08 ENCOUNTER — APPOINTMENT (OUTPATIENT)
Dept: DERMATOLOGY | Facility: CLINIC | Age: 45
End: 2025-08-08
Payer: MEDICAID

## 2025-08-08 VITALS — HEIGHT: 64 IN | WEIGHT: 137 LBS | BODY MASS INDEX: 23.39 KG/M2

## 2025-08-08 DIAGNOSIS — D18.01 HEMANGIOMA OF SKIN AND SUBCUTANEOUS TISSUE: ICD-10-CM

## 2025-08-08 DIAGNOSIS — L91.0 HYPERTROPHIC SCAR: ICD-10-CM

## 2025-08-08 DIAGNOSIS — L70.0 ACNE VULGARIS: ICD-10-CM

## 2025-08-08 PROCEDURE — 11900 INJECT SKIN LESIONS </W 7: CPT

## 2025-08-08 PROCEDURE — 99204 OFFICE O/P NEW MOD 45 MIN: CPT | Mod: 25

## 2025-08-08 RX ORDER — AZELAIC ACID 0.15 G/G
15 GEL TOPICAL
Qty: 1 | Refills: 2 | Status: ACTIVE | COMMUNITY
Start: 2025-08-08 | End: 1900-01-01

## 2025-08-08 RX ORDER — CLINDAMYCIN PHOSPHATE 10 MG/ML
1 LOTION TOPICAL
Qty: 1 | Refills: 11 | Status: ACTIVE | COMMUNITY
Start: 2025-08-08 | End: 1900-01-01

## 2025-08-12 RX ORDER — TRETINOIN 0.25 MG/G
0.03 CREAM TOPICAL
Qty: 1 | Refills: 3 | Status: ACTIVE | COMMUNITY
Start: 2025-08-08

## 2025-08-25 ENCOUNTER — NON-APPOINTMENT (OUTPATIENT)
Age: 45
End: 2025-08-25

## 2025-08-25 RX ORDER — TRETINOIN 0.25 MG/G
0.03 CREAM TOPICAL
Qty: 1 | Refills: 11 | Status: ACTIVE | COMMUNITY
Start: 2025-08-25 | End: 1900-01-01